# Patient Record
Sex: MALE | Race: OTHER | Employment: FULL TIME | URBAN - METROPOLITAN AREA
[De-identification: names, ages, dates, MRNs, and addresses within clinical notes are randomized per-mention and may not be internally consistent; named-entity substitution may affect disease eponyms.]

---

## 2024-05-08 DIAGNOSIS — M25.521 RIGHT ELBOW PAIN: Primary | ICD-10-CM

## 2024-05-23 ENCOUNTER — OFFICE VISIT (OUTPATIENT)
Age: 44
End: 2024-05-23

## 2024-05-23 VITALS — BODY MASS INDEX: 39.24 KG/M2 | HEIGHT: 67 IN | WEIGHT: 250 LBS

## 2024-05-23 DIAGNOSIS — M25.521 RIGHT ELBOW PAIN: ICD-10-CM

## 2024-05-23 DIAGNOSIS — M25.521 RIGHT ELBOW PAIN: Primary | ICD-10-CM

## 2024-05-23 RX ORDER — IBUPROFEN 200 MG
TABLET ORAL EVERY 6 HOURS PRN
COMMUNITY

## 2024-05-23 NOTE — PROGRESS NOTES
Name: Naveen Whaley    : 1980     [facility]  [address], [address]  [facility] [address]  Dept: [phone]  Dept Fax: [phone]     Chief Complaint   Patient presents with    Elbow Pain    Shoulder Pain        Ht 1.702 m (5' 7\")   Wt 113.4 kg (250 lb)   BMI 39.16 kg/m²      No Known Allergies     Current Outpatient Medications   Medication Sig Dispense Refill    ibuprofen (ADVIL;MOTRIN) 200 MG tablet Take by mouth every 6 hours as needed       No current facility-administered medications for this visit.      There is no problem list on file for this patient.     Family History   Family history unknown: Yes       History reviewed. No pertinent surgical history.   History reviewed. No pertinent past medical history.     I have reviewed and agree with PFSH and ROS and intake form in chart and the record furthermore I have reviewed prior medical record(s) regarding this patients care during this appointment.     Review of Systems:   Patient is a pleasant appearing individual, appropriately dressed, well hydrated, well nourished, who is alert, appropriately oriented for age, and in no acute distress with a normal gait and normal affect who does not appear to be in any significant pain.     Physical Exam:  Left Elbow - Full Range of motion, No point tenderness, No instability, Normal Strength, No skin lesions, No swelling, Grossly neurovascularly intact.     Encounter Diagnosis   Name Primary?    Right elbow pain Yes            Physical examination of his right elbow shows extremely limited range of motion with significant arthritic changes already on his x-rays.  Hardware appears to be intact.  He is grossly neurovascularly intact but he has a significant amount of swelling and stiffness and arthrofibrosis.        HPI:  The patient is here with a chief complaint of right elbow pain.  He fell off some

## 2024-05-23 NOTE — PATIENT INSTRUCTIONS
hand.     You cannot bend your arm.     You have a fever.     Your elbow looks red.     You have tingling, weakness, or numbness in your elbow, hand, or fingers.   Watch closely for changes in your health, and be sure to contact your doctor if:    Your pain is not better after 2 weeks.   Where can you learn more?  Go to [email or web address]://[email or web address].net/patientEd and enter T774 to learn more about \"Elbow Sprain: Care Instructions.\"  Current as of: March 9, 2022               Content Version: 13.5  © 3211-0742 [employer].   Care instructions adapted under license by [employer]. If you have questions about a medical condition or this instruction, always ask your healthcare professional. [employer] disclaims any warranty or liability for your use of this information.

## 2024-07-08 DIAGNOSIS — M25.511 RIGHT SHOULDER PAIN, UNSPECIFIED CHRONICITY: ICD-10-CM

## 2024-07-08 DIAGNOSIS — M25.521 RIGHT ELBOW PAIN: Primary | ICD-10-CM

## 2024-07-18 ENCOUNTER — OFFICE VISIT (OUTPATIENT)
Age: 44
End: 2024-07-18

## 2024-07-18 DIAGNOSIS — M25.521 RIGHT ELBOW PAIN: Primary | ICD-10-CM

## 2024-07-18 NOTE — PATIENT INSTRUCTIONS
hand.     You cannot bend your arm.     You have a fever.     Your elbow looks red.     You have tingling, weakness, or numbness in your elbow, hand, or fingers.   Watch closely for changes in your health, and be sure to contact your doctor if:    Your pain is not better after 2 weeks.   Where can you learn more?  Go to https://www.[email or web address].net/patientEd and enter T774 to learn more about \"Elbow Sprain: Care Instructions.\"  Current as of: March 9, 2022               Content Version: 13.5  © 1069-9982 [employer].   Care instructions adapted under license by [employer]. If you have questions about a medical condition or this instruction, always ask your healthcare professional. [employer] disclaims any warranty or liability for your use of this information.

## 2024-12-10 DIAGNOSIS — M25.521 RIGHT ELBOW PAIN: Primary | ICD-10-CM
